# Patient Record
Sex: FEMALE | Race: OTHER | Employment: OTHER | URBAN - METROPOLITAN AREA
[De-identification: names, ages, dates, MRNs, and addresses within clinical notes are randomized per-mention and may not be internally consistent; named-entity substitution may affect disease eponyms.]

---

## 2017-08-23 NOTE — PATIENT DISCUSSION
The IOP is in the target range. The visual field is without clear signs of progression. The patient will continue the current management.

## 2021-10-11 ENCOUNTER — REFRACTIVE CONSULT (OUTPATIENT)
Dept: URBAN - METROPOLITAN AREA CLINIC 43 | Facility: CLINIC | Age: 56
End: 2021-10-11

## 2021-10-11 DIAGNOSIS — Z98.890: ICD-10-CM

## 2021-10-11 DIAGNOSIS — H52.7: ICD-10-CM

## 2021-10-11 DIAGNOSIS — H35.033: ICD-10-CM

## 2021-10-11 PROCEDURE — 92134 CPTRZ OPH DX IMG PST SGM RTA: CPT

## 2021-10-11 PROCEDURE — 92025 CPTRIZED CORNEAL TOPOGRAPHY: CPT

## 2021-10-11 PROCEDURE — 92250 FUNDUS PHOTOGRAPHY W/I&R: CPT

## 2021-10-11 PROCEDURE — 92286 ANT SGM IMG I&R SPECLR MIC: CPT

## 2021-10-11 PROCEDURE — 99499LK REFRACTIVE CONSULT/LASIK

## 2021-10-11 PROCEDURE — 92136TC INTERFEROMETRY - TECHNICAL COMPONENT

## 2021-10-11 ASSESSMENT — VISUAL ACUITY
OD_BAT: 20/70
OD_SC: 20/70
OD_SC: J2
OS_SC: 20/40-1
OS_SC: J4
OS_BAT: 20/60

## 2021-10-11 ASSESSMENT — TONOMETRY
OS_IOP_MMHG: 14
OD_IOP_MMHG: 14

## 2021-11-04 ENCOUNTER — ESTABLISHED PATIENT (OUTPATIENT)
Dept: URBAN - METROPOLITAN AREA SURGERY 14 | Facility: SURGERY | Age: 56
End: 2021-11-04

## 2021-11-04 ENCOUNTER — SURGERY/PROCEDURE (OUTPATIENT)
Dept: URBAN - METROPOLITAN AREA CLINIC 43 | Facility: CLINIC | Age: 56
End: 2021-11-04

## 2021-11-04 DIAGNOSIS — H52.7: ICD-10-CM

## 2021-11-04 DIAGNOSIS — Z98.890: ICD-10-CM

## 2021-11-04 DIAGNOSIS — H35.033: ICD-10-CM

## 2021-11-04 DIAGNOSIS — Z97.3: ICD-10-CM

## 2021-11-04 PROCEDURE — 99211HP H&P OFFICE/OUTPATIENT VISIT, EST

## 2021-11-04 PROCEDURE — 66999LNSR LENSAR LASER FOR CAT SX

## 2021-11-04 PROCEDURE — 66999RAV RLE WITH ADVANCED LENS

## 2021-11-04 PROCEDURE — 65772LRI LRI DURING CAT SX

## 2021-11-05 ENCOUNTER — CATARACT POST-OP 1-DAY (OUTPATIENT)
Dept: URBAN - METROPOLITAN AREA CLINIC 39 | Facility: CLINIC | Age: 56
End: 2021-11-05

## 2021-11-05 DIAGNOSIS — Z96.1: ICD-10-CM

## 2021-11-05 PROCEDURE — 99024 POSTOP FOLLOW-UP VISIT: CPT

## 2021-11-05 ASSESSMENT — VISUAL ACUITY
OD_SC: 20/40-1
OD_SC: J8

## 2021-11-05 ASSESSMENT — TONOMETRY
OS_IOP_MMHG: 15
OD_IOP_MMHG: 15

## 2021-11-09 ENCOUNTER — POST OP/EVAL OF SECOND EYE (OUTPATIENT)
Dept: URBAN - METROPOLITAN AREA CLINIC 39 | Facility: CLINIC | Age: 56
End: 2021-11-09

## 2021-11-09 DIAGNOSIS — Z96.1: ICD-10-CM

## 2021-11-09 PROCEDURE — 99024 POSTOP FOLLOW-UP VISIT: CPT

## 2021-11-09 ASSESSMENT — VISUAL ACUITY
OD_PH: 20/25
OD_SC: J1+
OD_SC: 20/50-2

## 2021-11-09 ASSESSMENT — TONOMETRY
OS_IOP_MMHG: 13
OD_IOP_MMHG: 13

## 2021-11-11 ENCOUNTER — ESTABLISHED PATIENT (OUTPATIENT)
Dept: URBAN - METROPOLITAN AREA SURGERY 14 | Facility: SURGERY | Age: 56
End: 2021-11-11

## 2021-11-11 ENCOUNTER — FOLLOW UP (OUTPATIENT)
Dept: URBAN - METROPOLITAN AREA CLINIC 39 | Facility: CLINIC | Age: 56
End: 2021-11-11

## 2021-11-11 ENCOUNTER — SURGERY/PROCEDURE (OUTPATIENT)
Dept: URBAN - METROPOLITAN AREA CLINIC 43 | Facility: CLINIC | Age: 56
End: 2021-11-11

## 2021-11-11 DIAGNOSIS — Z96.1: ICD-10-CM

## 2021-11-11 DIAGNOSIS — H52.12: ICD-10-CM

## 2021-11-11 PROCEDURE — 65772LRI LRI DURING CAT SX

## 2021-11-11 PROCEDURE — 66999RAV RLE WITH ADVANCED LENS

## 2021-11-11 PROCEDURE — 99211HP H&P OFFICE/OUTPATIENT VISIT, EST

## 2021-11-11 PROCEDURE — 99211T TECH SERVICE

## 2021-11-11 PROCEDURE — 66999LNSR LENSAR LASER FOR CAT SX

## 2021-11-11 ASSESSMENT — VISUAL ACUITY
OS_SC: J4
OD_SC: J1
OS_SC: 20/40
OD_SC: 20/30

## 2021-11-12 ENCOUNTER — CATARACT POST-OP 1-DAY (OUTPATIENT)
Dept: URBAN - METROPOLITAN AREA CLINIC 39 | Facility: CLINIC | Age: 56
End: 2021-11-12

## 2021-11-12 DIAGNOSIS — Z96.1: ICD-10-CM

## 2021-11-12 PROCEDURE — 99024 POSTOP FOLLOW-UP VISIT: CPT

## 2021-11-12 ASSESSMENT — VISUAL ACUITY
OS_SC: 20/30+2
OS_SC: J1
OD_SC: 20/25-2
OD_SC: J1

## 2021-11-12 ASSESSMENT — TONOMETRY
OS_IOP_MMHG: 14
OD_IOP_MMHG: 14

## 2021-12-10 ENCOUNTER — POST-OP (OUTPATIENT)
Dept: URBAN - METROPOLITAN AREA CLINIC 39 | Facility: CLINIC | Age: 56
End: 2021-12-10

## 2021-12-10 DIAGNOSIS — Z96.1: ICD-10-CM

## 2021-12-10 PROCEDURE — 99024 POSTOP FOLLOW-UP VISIT: CPT

## 2021-12-10 ASSESSMENT — TONOMETRY
OD_IOP_MMHG: 14
OS_IOP_MMHG: 15

## 2021-12-10 ASSESSMENT — VISUAL ACUITY
OU_SC: 20/40-2
OU_SC: J1+
OS_BAT: 20/40 W/MR
OS_SC: J1+
OD_SC: J1+
OD_BAT: 20/40 W/MR
OD_SC: 20/40+2
OS_SC: 20/100-1
OD_PH: 20/30-1
OS_PH: 20/40

## 2022-03-10 ENCOUNTER — CONSULTATION/EVALUATION (OUTPATIENT)
Dept: URBAN - METROPOLITAN AREA CLINIC 39 | Facility: CLINIC | Age: 57
End: 2022-03-10

## 2022-03-10 ENCOUNTER — SURGERY/PROCEDURE (OUTPATIENT)
Dept: URBAN - METROPOLITAN AREA SURGERY 14 | Facility: SURGERY | Age: 57
End: 2022-03-10

## 2022-03-10 DIAGNOSIS — H26.493: ICD-10-CM

## 2022-03-10 DIAGNOSIS — H26.492: ICD-10-CM

## 2022-03-10 DIAGNOSIS — H26.491: ICD-10-CM

## 2022-03-10 PROCEDURE — 92014 COMPRE OPH EXAM EST PT 1/>: CPT

## 2022-03-10 PROCEDURE — 66821RLE YAG CAPSULOTOMY, POST RLE

## 2022-03-10 ASSESSMENT — VISUAL ACUITY
OS_SC: J1
OD_SC: 20/60
OD_BAT: 20/40
OS_BAT: 20/40
OD_CC: 20/30-2
OS_CC: 20/50-1
OS_SC: 20/200+1
OD_SC: J1

## 2022-03-10 ASSESSMENT — TONOMETRY
OS_IOP_MMHG: 10
OD_IOP_MMHG: 11

## 2022-03-22 ENCOUNTER — POST-OP (OUTPATIENT)
Dept: URBAN - METROPOLITAN AREA CLINIC 39 | Facility: CLINIC | Age: 57
End: 2022-03-22

## 2022-03-22 DIAGNOSIS — Z98.890: ICD-10-CM

## 2022-03-22 PROCEDURE — 99024 POSTOP FOLLOW-UP VISIT: CPT

## 2022-03-22 ASSESSMENT — VISUAL ACUITY
OU_SC: J1+
OD_SC: 20/40+1
OS_SC: J1+
OU_SC: 20/40+1
OS_SC: 20/100
OD_SC: J1+

## 2022-03-22 ASSESSMENT — TONOMETRY
OD_IOP_MMHG: 13
OS_IOP_MMHG: 13

## 2022-04-08 ENCOUNTER — POST-OP (OUTPATIENT)
Dept: URBAN - METROPOLITAN AREA CLINIC 39 | Facility: CLINIC | Age: 57
End: 2022-04-08

## 2022-04-08 DIAGNOSIS — Z98.890: ICD-10-CM

## 2022-04-08 PROCEDURE — 99024 POSTOP FOLLOW-UP VISIT: CPT

## 2022-04-08 ASSESSMENT — TONOMETRY
OS_IOP_MMHG: 16
OD_IOP_MMHG: 14

## 2022-04-08 ASSESSMENT — VISUAL ACUITY
OS_SC: J1
OU_SC: J1
OD_SC: J1
OS_CC: 20/40
OU_SC: 20/30
OS_SC: 20/200
OD_SC: 20/30-2

## 2022-05-04 ENCOUNTER — SURGERY/PROCEDURE (OUTPATIENT)
Dept: URBAN - METROPOLITAN AREA CLINIC 39 | Facility: CLINIC | Age: 57
End: 2022-05-04

## 2022-05-04 DIAGNOSIS — H52.12: ICD-10-CM

## 2022-05-04 DIAGNOSIS — H52.7: ICD-10-CM

## 2022-05-04 PROCEDURE — 66999SPP EPI LASEK S/P ADVANCED / CUSTOM < 12 MONTHS

## 2022-05-05 ENCOUNTER — POST-OP (OUTPATIENT)
Dept: URBAN - METROPOLITAN AREA CLINIC 39 | Facility: CLINIC | Age: 57
End: 2022-05-05

## 2022-05-05 DIAGNOSIS — Z98.890: ICD-10-CM

## 2022-05-05 PROCEDURE — 99024 POSTOP FOLLOW-UP VISIT: CPT

## 2022-05-05 ASSESSMENT — VISUAL ACUITY
OS_SC: 20/60
OS_SC: J5
OD_SC: 20/70
OD_SC: J1

## 2022-05-11 ENCOUNTER — POST-OP (OUTPATIENT)
Dept: URBAN - METROPOLITAN AREA CLINIC 39 | Facility: CLINIC | Age: 57
End: 2022-05-11

## 2022-05-11 DIAGNOSIS — H52.7: ICD-10-CM

## 2022-05-11 DIAGNOSIS — Z98.890: ICD-10-CM

## 2022-05-11 PROCEDURE — 99024 POSTOP FOLLOW-UP VISIT: CPT

## 2022-05-11 ASSESSMENT — VISUAL ACUITY
OU_SC: 20/25
OS_SC: J4
OD_SC: J2
OS_SC: 20/30
OD_SC: 20/30

## 2022-07-19 ENCOUNTER — POST-OP (OUTPATIENT)
Dept: URBAN - METROPOLITAN AREA CLINIC 39 | Facility: CLINIC | Age: 57
End: 2022-07-19

## 2022-07-19 DIAGNOSIS — Z98.890: ICD-10-CM

## 2022-07-19 PROCEDURE — 99024 POSTOP FOLLOW-UP VISIT: CPT

## 2022-07-19 ASSESSMENT — TONOMETRY
OS_IOP_MMHG: 16
OD_IOP_MMHG: 16

## 2022-07-19 ASSESSMENT — VISUAL ACUITY
OS_SC: J1
OU_SC: J1
OD_SC: 20/30-1
OS_SC: 20/30-1
OD_SC: J1
OU_SC: 20/25